# Patient Record
Sex: MALE | Race: BLACK OR AFRICAN AMERICAN | NOT HISPANIC OR LATINO | URBAN - METROPOLITAN AREA
[De-identification: names, ages, dates, MRNs, and addresses within clinical notes are randomized per-mention and may not be internally consistent; named-entity substitution may affect disease eponyms.]

---

## 2022-03-22 ENCOUNTER — HOSPITAL ENCOUNTER (EMERGENCY)
Facility: HOSPITAL | Age: 22
Discharge: HOME | End: 2022-03-22
Attending: EMERGENCY MEDICINE
Payer: MEDICAID

## 2022-03-22 VITALS
TEMPERATURE: 98 F | SYSTOLIC BLOOD PRESSURE: 128 MMHG | RESPIRATION RATE: 16 BRPM | WEIGHT: 160 LBS | OXYGEN SATURATION: 99 % | HEART RATE: 88 BPM | DIASTOLIC BLOOD PRESSURE: 69 MMHG | BODY MASS INDEX: 22.4 KG/M2 | HEIGHT: 71 IN

## 2022-03-22 DIAGNOSIS — L08.9 WOUND INFECTION: ICD-10-CM

## 2022-03-22 DIAGNOSIS — T14.8XXA WOUND INFECTION: ICD-10-CM
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
## 2022-03-22 PROCEDURE — 99284 EMERGENCY DEPT VISIT MOD MDM: CPT | Mod: ER

## 2022-03-22 RX ORDER — HYDROCODONE BITARTRATE AND ACETAMINOPHEN 5; 325 MG/1; MG/1
1 TABLET ORAL EVERY 6 HOURS PRN
Qty: 12 TABLET | Refills: 0 | Status: SHIPPED | OUTPATIENT
Start: 2022-03-22 | End: 2022-04-01

## 2022-03-22 RX ORDER — MUPIROCIN 20 MG/G
OINTMENT TOPICAL 3 TIMES DAILY
Qty: 22 G | Refills: 0 | Status: SHIPPED | OUTPATIENT
Start: 2022-03-22 | End: 2022-03-29

## 2022-03-22 RX ORDER — DOXYCYCLINE 100 MG/1
100 CAPSULE ORAL 2 TIMES DAILY
Qty: 14 CAPSULE | Refills: 0 | Status: SHIPPED | OUTPATIENT
Start: 2022-03-22 | End: 2022-03-29
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
## 2024-10-01 ENCOUNTER — HOSPITAL ENCOUNTER (EMERGENCY)
Facility: HOSPITAL | Age: 24
Discharge: HOME | End: 2024-10-01
Attending: EMERGENCY MEDICINE

## 2024-10-01 VITALS
WEIGHT: 165 LBS | OXYGEN SATURATION: 98 % | HEART RATE: 74 BPM | TEMPERATURE: 99 F | SYSTOLIC BLOOD PRESSURE: 116 MMHG | DIASTOLIC BLOOD PRESSURE: 74 MMHG | HEIGHT: 72 IN | BODY MASS INDEX: 22.35 KG/M2 | RESPIRATION RATE: 20 BRPM

## 2024-10-01 DIAGNOSIS — M25.531 RIGHT WRIST PAIN: ICD-10-CM

## 2024-10-01 DIAGNOSIS — S62.340A NONDISPLACED FRACTURE OF BASE OF SECOND METACARPAL BONE, RIGHT HAND, INITIAL ENCOUNTER FOR CLOSED FRACTURE: Primary | ICD-10-CM

## 2024-10-01 DIAGNOSIS — T14.90XA INJURY: ICD-10-CM

## 2024-10-01 DIAGNOSIS — M25.512 ACUTE PAIN OF LEFT SHOULDER: ICD-10-CM

## 2024-10-01 PROCEDURE — 99284 EMERGENCY DEPT VISIT MOD MDM: CPT | Mod: 25,ER

## 2024-10-01 PROCEDURE — 63600175 PHARM REV CODE 636 W HCPCS: Mod: ER | Performed by: NURSE PRACTITIONER

## 2024-10-01 PROCEDURE — 96372 THER/PROPH/DIAG INJ SC/IM: CPT | Performed by: NURSE PRACTITIONER

## 2024-10-01 PROCEDURE — 29125 APPL SHORT ARM SPLINT STATIC: CPT | Mod: RT,ER

## 2024-10-01 RX ORDER — KETOROLAC TROMETHAMINE 30 MG/ML
15 INJECTION, SOLUTION INTRAMUSCULAR; INTRAVENOUS
Status: COMPLETED | OUTPATIENT
Start: 2024-10-01 | End: 2024-10-01

## 2024-10-01 RX ORDER — HYDROCODONE BITARTRATE AND ACETAMINOPHEN 5; 325 MG/1; MG/1
1 TABLET ORAL EVERY 6 HOURS PRN
Qty: 12 TABLET | Refills: 0 | Status: SHIPPED | OUTPATIENT
Start: 2024-10-01

## 2024-10-01 RX ADMIN — KETOROLAC TROMETHAMINE 15 MG: 30 INJECTION, SOLUTION INTRAMUSCULAR at 08:10
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
## 2024-10-01 NOTE — Clinical Note
"Erwin JENSEN "Erwinfaby Neal was seen and treated in our emergency department on 10/1/2024.  He may return to work after being cleared by follow-up physician .       If you have any questions or concerns, please don't hesitate to call.      Neo Castaneda, YUVAL"

## 2025-06-25 ENCOUNTER — HOSPITAL ENCOUNTER (EMERGENCY)
Facility: HOSPITAL | Age: 25
Discharge: HOME | End: 2025-06-25
Attending: EMERGENCY MEDICINE
Payer: MEDICAID

## 2025-06-25 VITALS
OXYGEN SATURATION: 100 % | BODY MASS INDEX: 20.32 KG/M2 | RESPIRATION RATE: 18 BRPM | HEART RATE: 66 BPM | SYSTOLIC BLOOD PRESSURE: 102 MMHG | HEIGHT: 72 IN | DIASTOLIC BLOOD PRESSURE: 68 MMHG | WEIGHT: 150 LBS | TEMPERATURE: 98 F

## 2025-06-25 DIAGNOSIS — S39.012A BACK STRAIN, INITIAL ENCOUNTER: Primary | ICD-10-CM

## 2025-06-25 PROCEDURE — 63600175 PHARM REV CODE 636 W HCPCS: Mod: JZ,TB,ER

## 2025-06-25 PROCEDURE — 96372 THER/PROPH/DIAG INJ SC/IM: CPT

## 2025-06-25 PROCEDURE — 99284 EMERGENCY DEPT VISIT MOD MDM: CPT | Mod: 25,ER

## 2025-06-25 RX ORDER — ACETAMINOPHEN 500 MG
500 TABLET ORAL EVERY 6 HOURS PRN
Qty: 30 TABLET | Refills: 0 | Status: SHIPPED | OUTPATIENT
Start: 2025-06-25

## 2025-06-25 RX ORDER — CYCLOBENZAPRINE HCL 10 MG
10 TABLET ORAL 3 TIMES DAILY PRN
Qty: 15 TABLET | Refills: 0 | Status: SHIPPED | OUTPATIENT
Start: 2025-06-25 | End: 2025-06-30

## 2025-06-25 RX ORDER — IBUPROFEN 600 MG/1
600 TABLET, FILM COATED ORAL EVERY 6 HOURS PRN
Qty: 20 TABLET | Refills: 0 | Status: SHIPPED | OUTPATIENT
Start: 2025-06-25

## 2025-06-25 RX ORDER — KETOROLAC TROMETHAMINE 30 MG/ML
30 INJECTION, SOLUTION INTRAMUSCULAR; INTRAVENOUS
Status: COMPLETED | OUTPATIENT
Start: 2025-06-25 | End: 2025-06-25

## 2025-06-25 RX ADMIN — KETOROLAC TROMETHAMINE 30 MG: 30 INJECTION, SOLUTION INTRAMUSCULAR; INTRAVENOUS at 01:06

## 2025-06-25 NOTE — DISCHARGE INSTRUCTIONS
Take tylenol and ibuprofen as directed for pain.  Take muscle relaxer as directed for muscle spasm.  Do not take muscle relaxer while operating heavy machinery or drive a vehicle due to potential drowsiness.    Thank you for coming to our Emergency Department today. It is important to remember that some problems or medical conditions are difficult to diagnose and may not be found or addressed during your Emergency Department visit.  These conditions often start with non-specific symptoms and can only be diagnosed on follow up visits with your primary care physician or specialist when the symptoms continue or change. Please remember that all medical conditions can change, and we cannot predict how you will be feeling tomorrow or the next day. Return to the ER with any questions/concerns, new/concerning symptoms, worsening or failure to improve.       Be sure to follow up with your primary care doctor and review all labs/imaging/tests that were performed during your ER visit with them. It is very common for us to identify non-emergent incidental findings which must be followed up with your primary care physician.  Some labs/imaging/tests may be outside of the normal range, and require non-emergent follow-up and/or further investigation/treatment/procedures/testing to help diagnose/exclude/prevent complications or other potentially serious medical conditions. Some abnormalities may not have been discussed or addressed during your ER visit.     An ER visit does not replace a primary care visit, and many screening tests or follow-up tests cannot be ordered by an ER doctor or performed by the ER. Some tests may even require pre-approval.    If you do not have a primary care doctor, you may contact the one listed on your discharge paperwork or you may also call the [facility] Appointment Desk at [phone] , or [address] [place] [address] at  [phone] to schedule an appointment, or establish care with a primary care doctor or  even a specialist and to obtain information about local resources. It is important to your health that you have a primary care doctor.    Please take all medications as directed. We have done our best to select a medication for you that will treat your condition however, all medications may potentially have side-effects and it is impossible to predict which medications may give you side-effects or what those side-effects (if any) those medications may give you.  If you feel that you are having a negative effect or side-effect of any medication you should stop taking those medications immediately and seek medical attention. If you feel that you are having a life-threatening reaction call 911.        Do not drive, swim, climb to height, take a bath, operate heavy machinery, drink alcohol or take potentially sedating medications, sign any legal documents or make any important decisions for 24 hours if you have received any pain medications, sedatives or mood altering drugs during your ER visit or within 24 hours of taking them if they have been prescribed to you.     You can find additional resources for Dentists, hearing aids, durable medical equipment, low cost pharmacies and other resources at [email or web address]

## 2025-06-25 NOTE — Clinical Note
"Erwin JENSEN "Erwinleena Neal was seen and treated in our emergency department on 6/25/2025.  He may return to work on 06/26/2025.       If you have any questions or concerns, please don't hesitate to call.      Chava Viveros NRP     "

## 2025-06-25 NOTE — ED PROVIDER NOTES
"Encounter Date: 6/25/2025    SCRIBE #1 NOTE: I, Imelda Ulloa, am scribing for, and in the presence of,  Kristin Melo PA-C. I have scribed the following portions of the note - Other sections scribed: HPI, ROS, PE.       History     Chief Complaint   Patient presents with    Back Pain     Pt presents w/ a c/o of intermittent back pain for a week now. Report pain worsening with movement. Report heavy lifting for his job occupation.      Erwin Neal is a 25 y.o. male with no PMHx who presents to the ED with intermittent bilateral thoracic back pain x 1 week. Patient reports the back pain began while doing some heavy lifting at work. Patient describes the pain as a "soreness" that has gradually worsened since onset, reaching its worst last night.  Reports the pain is exacerbated with movement. No attempted treatment with OTC pain medication. No other exacerbating or alleviating factors. He denies the back pain radiating into his upper or lower extremities. Denies any numbness, paresthesia, saddle anesthesia, urinary/bowel incontinence, fever, chills, SOB, chest pain, dysuria, hematuria, difficulty urinating or other associated symptoms.     The history is provided by the patient. No  was used.     Review of patient's allergies indicates:  No Known Allergies  History reviewed. No pertinent past medical history.  History reviewed. No pertinent surgical history.  No family history on file.  Social History[1]  Review of Systems   Constitutional:  Negative for chills and fever.   Respiratory:  Negative for shortness of breath.    Cardiovascular:  Negative for chest pain.   Gastrointestinal:         (-)Bowel incontinence    Genitourinary:  Negative for difficulty urinating, dysuria, enuresis and hematuria.   Musculoskeletal:  Positive for back pain (bilateral thoracic).   Neurological:  Negative for weakness and numbness.        (-)Paresthesia  (-)Saddle anesthesia       Physical Exam "     Initial Vitals [06/25/25 1238]   BP Pulse Resp Temp SpO2   101/67 66 20 97.5 °F (36.4 °C) 100 %      MAP       --         Physical Exam    Nursing note and vitals reviewed.  Constitutional: He appears well-developed and well-nourished. He is not diaphoretic. No distress.   HENT:   Head: Normocephalic and atraumatic.   Right Ear: External ear normal.   Left Ear: External ear normal.   Eyes: Conjunctivae and EOM are normal.   Neck: Neck supple.   Normal range of motion.  Pulmonary/Chest: No stridor. No respiratory distress.   Musculoskeletal:         General: Normal range of motion.      Cervical back: Normal range of motion and neck supple.      Thoracic back: Tenderness present.      Comments: There is thoracic paraspinal tenderness present. No midline spine tenderness to palpation. There is no erythema or rash present to the back. Normal strength and sensations noted to bilateral upper and lower extremities.      Neurological: He is alert and oriented to person, place, and time.   Skin: No rash noted. No erythema.   Psychiatric: He has a normal mood and affect. Thought content normal.         ED Course   Procedures  Labs Reviewed - No data to display       Imaging Results    None          Medications   ketorolac injection 30 mg (30 mg Intramuscular Given 6/25/25 1314)     Medical Decision Making  Erwin Neal is a 25 y.o. male with no PMHx who presents to the ED with intermittent bilateral thoracic back pain x 1 week.    Differential includes but not limited to muscle spasm, muscle strain, spinal fracture/dislocation is less likely due to lack of known injury.  Cauda equina however less likely due to lack of saddle anesthesia, leg weakness, or numbness.    Patient is alert and afebrile.  Patient is nontoxic appearing, not in distress.  Vitals within normal limits.  On physical exam patient ambulating without difficulty.  No wheezing stridor or respiratory distress present.  Patient is speaking in full  sentences without difficulty. There is thoracic paraspinal tenderness present. No midline spine tenderness to palpation. There is no erythema or rash present to the back. Normal strength and sensations noted to bilateral upper and lower extremities.  No signs of fluid overload.  No leg edema.  Strong distal radial pulse bilaterally.    I suspect symptoms most likely due to muscular strain.  Shared decision-making with patient regarding imaging for further evaluation, which he decides against at this time. Patient given IM Toradol.  Prescribed NSAIDs, Tylenol, lidocaine patch, and muscle relaxer for muscle strain.  Advised of potential drowsiness of muscle relaxer. Strict return precautions given for new or worsening symptoms.  Recommended follow up with PCP in 2 days.  Patient is in agreeance to this plan, and expresses understanding return precautions and follow up plan.  I thoroughly answered all patient's questions and concerns. Vitals are reassuring.  Patient is stable for discharge at this time.    Risk  OTC drugs.  Prescription drug management.            Scribe Attestation:   Scribe #1: I performed the above scribed service and the documentation accurately describes the services I performed. I attest to the accuracy of the note.                             I, Kristin Melo PA-C, personally performed the services described in this documentation. All medical record entries made by the scribe were at my direction and in my presence. I have reviewed the chart and agree that the record reflects my personal performance and is accurate and complete.    [employer], a voice recognition system was utilized in creating the note.     Clinical Impression:  Final diagnoses:  [S39.012A] Back strain, initial encounter (Primary)          ED Disposition Condition    Discharge Stable          ED Prescriptions       Medication Sig Dispense Start Date End Date Auth. Provider    ibuprofen (ADVIL,MOTRIN) 600 MG tablet Take 1 tablet  (600 mg total) by mouth every 6 (six) hours as needed for Pain. 20 tablet 6/25/2025 -- Kristin Melo PA-C    acetaminophen (TYLENOL) 500 MG tablet Take 1 tablet (500 mg total) by mouth every 6 (six) hours as needed for Pain. 30 tablet 6/25/2025 -- Kristin Melo PA-C    cyclobenzaprine (FLEXERIL) 10 MG tablet Take 1 tablet (10 mg total) by mouth 3 (three) times daily as needed for Muscle spasms. 15 tablet 6/25/2025 6/30/2025 Kristin Melo PA-C          Follow-up Information       Follow up With Specialties Details Why Contact [facility] ED Emergency Medicine Go to  If new symptoms develop or symptoms worsen [address] [facility] [address] [phone]    Your Primary Care Provider  Schedule an appointment as soon as possible for a visit in 2 days For follow up                    [1]   Social History  Tobacco Use    Smoking status: Never   Substance Use Topics    Alcohol use: Yes     Comment: occ    Drug use: Yes     Types: Marijuana     Comment: occ        Kristin Melo PA-C  06/25/25 2659

## 2025-06-25 NOTE — ED NOTES
Erwin Neal, a 25 y.o. male presents to the ED w/ complaint of back pain x 1 week.       Chief Complaint   Patient presents with    Back Pain     Pt presents w/ a c/o of intermittent back pain for a week now. Report pain worsening with movement. Report heavy lifting for his job occupation.      Review of patient's allergies indicates:  No Known Allergies  History reviewed. No pertinent past medical history.